# Patient Record
Sex: MALE | Race: WHITE
[De-identification: names, ages, dates, MRNs, and addresses within clinical notes are randomized per-mention and may not be internally consistent; named-entity substitution may affect disease eponyms.]

---

## 2018-09-26 ENCOUNTER — HOSPITAL ENCOUNTER (OUTPATIENT)
Dept: HOSPITAL 59 - SUR | Age: 33
Discharge: HOME | End: 2018-09-26
Attending: ORTHOPAEDIC SURGERY
Payer: COMMERCIAL

## 2018-09-26 DIAGNOSIS — S83.211A: Primary | ICD-10-CM

## 2018-09-26 PROCEDURE — 01400 ANES OPN/ARTHRS KNEE JT NOS: CPT

## 2018-09-26 PROCEDURE — 29881 ARTHRS KNE SRG MNISECTMY M/L: CPT

## 2018-09-29 NOTE — OPERATIVE NOTE
DATE OF SURGERY:  09/26/2018.



PREOPERATIVE DIAGNOSIS:  INTERNAL DERANGEMENT OF THE RIGHT KNEE.



POSTOPERATIVE DIAGNOSIS:  DISPLACED BUCKET-HANDLE TEAR OF THE MEDIAL MENISCUS 
WHICH APPEARS CHRONIC.



PROCEDURE:  

Right knee arthroscopy with medial meniscectomy.



STAFF SURGEON:  John Pierce M.D.



ANESTHESIA:  General



PREPARATION:  ChloraPrep.



INDIVIDUAL CONSIDERATIONS:  None.



PROCEDURE:  The patient was taken to the Operating Room and placed supine on 
the operating table.  He had a successful induction of a general anesthetic.  
His right knee was prepped and draped in the usual fashion.



The patient had a superolateral inflow cannula placed.  The skin had been 
infiltrated with 0.5% Marcaine with epinephrine prior.  Clear effusion was 
drained, and the knee was inflated with normal saline.  



An inferomedial and an inferolateral portal were made in a similar fashion.  
The arthroscope was introduced through the inferolateral portal up into the 
pouch.  The patellofemoral compartment was normal.  The medial compartment 
showed an obviously displaced bucket-handle tear of the medial meniscus.  I 
tried to reduce this but I was unable to.  It appears it has been sitting there 
for a number of weeks.  I then elected to just go ahead and resect it.  I went 
ahead and detached it with scissors anteromedially and then amputated it near 
the posterolateral portion near the notch.  I then removed that portion and 
then reduced the remainder.  It appeared that it was more of almost a split and 
about jail through the meniscus.  I removed the remaining posterior portion 
with the basket forceps and a shaver.  I removed a large piece and then the 
remaining rim I debrided back to a stable rim with the shaver.



In the notch the cruciates were normal, and the lateral compartment structures 
were normal.  The knee was then irrigated out with saline to remove floating 
debris.  The portals were closed with staples, and 20 mL of 0.5% Marcaine with 
epinephrine along with 4.0 mg of Marcaine and 40 mg of Depo Medrol were 
injected into the knee.  A sterile Bulkee compressive dressing was applied.



The patient tolerated the procedure well.  Needle and sponge counts were 
correct.  Estimated blood loss was minimal and he was taken back to Recovery in 
good condition.  There were no complications. 



JOB NUMBER:  [id number]
[facility]